# Patient Record
(demographics unavailable — no encounter records)

---

## 2017-12-12 NOTE — RAD
PROCEDURE:

HYSTEROSALPINGOGRAM:

 

INDICATION: 

Infertility.  Last menstrual period 7 days ago.  The patient is  0 para 0.  The patient has be
en trying to conceive x 2 years. 

 

FINDINGS: 

Uterus is slightly deviated to the right.  Uterine contour appears normal.  No intrauterine filling d
efect.  Fallopian tubes are patent.  Fimbria appear unremarkable.  Contrast spill is seen from both f
imbria.  

 

IMPRESSION: 

Unremarkable hysterosalpingogram.

 

 

PROCEDURE NOTE:

The procedure was discussed with the patient.  The patient was placed in lithotomy position.  A steri
le speculum was placed.  Cervix was cleaned with Betadine.  The cervix is sounded to 3 cm.  The inter
nal os was closed and was difficult to penetrate with the sound.  After opening the internal os, the 
HSG catheter was placed into the uterine cavity.  Balloon was inflated.  Water-soluble contrast was s
lowly injected under fluoroscopic observation.  Multiple images were obtained.  The balloon was defla
susana and the lower uterine segment was opacified.

 

There were no problems or complications.

 

POS: [facility]

## 2018-12-10 NOTE — PDOC.OPDEL
OB Operative/Delivery Note


Delivery Dr/Surgeon: Stephanie


Pre-Delivery Diagnosis: elective induction


Procedure/Post Delivery Dx: spontaneous vaginal delivery


Weeks gestation: 40


Anesthesia: epidural





- Findings


  ** A


Sex: female


Apgar - 1 min: 8


Apgar - 5 min: 9





- Additional Findings/Plan


Placenta delivered: spontaneous


Repaired Obstetrical Laceration: episiotomy (mid line --repsired with 2-0 

chromic.)


Compilations/Other Findings: 





loose nuchal cord x 1 with body cord.


Post delivery plan: routine recovery

## 2018-12-11 NOTE — PDOC.PP
Post Partum Progress Note


Post Partum Day #: 1


PO intake tolerated: yes


Flatus: yes


Ambulation: yes


 Vital Signs (12 hours)











  Temp Pulse Resp BP Pulse Ox


 


 12/11/18 04:30  98.6 F  115 H  18  121/73 


 


 12/11/18 00:00  98.5 F  103 H  18  111/58 L 


 


 12/10/18 21:20  98.1 F  100  16  113/60 


 


 12/10/18 20:30  97.8 F  101 H  16  114/60  97








 Weight











Weight                         192 lb

















- Physical Examination


Respiratory: non-labored breathing


Abdominal: no distention, appropriately TTP


Extremities: negative homans (B)


Result Diagrams: 


 12/09/18 22:26





Additional Labs: 


 Post Partum Labs











Blood Type  A POSITIVE   12/09/18  22:26    


 


Hep Bs Antigen  Non-Reactive S/CO (NonReactive)   12/09/18  22:26    














- Assessment/Plan





post partum day 1-doing well. routine care. discharge in AM.

## 2018-12-12 NOTE — PDOC.PP
Post Partum Progress Note


Post Partum Day #: 1


PO intake tolerated: yes


Flatus: yes


Ambulation: yes


 Vital Signs (12 hours)











  Temp Pulse Resp BP Pulse Ox


 


 12/12/18 07:58  98.4 F  106 H  20  119/70  98


 


 12/11/18 20:40      98


 


 12/11/18 20:22  97.6 F  100  20  119/71  98








 Weight











Weight                         192 lb

















- Physical Examination


Abdominal: no distention, appropriately TTP


Extremities: negative homans (B)


Result Diagrams: 


 12/09/18 22:26





Additional Labs: 


 Post Partum Labs











Blood Type  A POSITIVE   12/09/18  22:26    


 


Hep Bs Antigen  Non-Reactive S/CO (NonReactive)   12/09/18  22:26    














- Assessment/Plan





Post partum day 2. Doing well..D/c home. F/u in 6 weeks.